# Patient Record
Sex: MALE | Race: WHITE | NOT HISPANIC OR LATINO | Employment: FULL TIME | URBAN - NONMETROPOLITAN AREA
[De-identification: names, ages, dates, MRNs, and addresses within clinical notes are randomized per-mention and may not be internally consistent; named-entity substitution may affect disease eponyms.]

---

## 2021-11-23 ENCOUNTER — OFFICE VISIT (OUTPATIENT)
Dept: FAMILY MEDICINE CLINIC | Facility: CLINIC | Age: 21
End: 2021-11-23

## 2021-11-23 VITALS
BODY MASS INDEX: 23.75 KG/M2 | TEMPERATURE: 99.2 F | HEIGHT: 73 IN | DIASTOLIC BLOOD PRESSURE: 68 MMHG | OXYGEN SATURATION: 100 % | SYSTOLIC BLOOD PRESSURE: 118 MMHG | HEART RATE: 112 BPM | WEIGHT: 179.2 LBS

## 2021-11-23 DIAGNOSIS — F41.9 ANXIETY AND DEPRESSION: Primary | ICD-10-CM

## 2021-11-23 DIAGNOSIS — K21.9 GASTROESOPHAGEAL REFLUX DISEASE WITHOUT ESOPHAGITIS: ICD-10-CM

## 2021-11-23 DIAGNOSIS — F32.A ANXIETY AND DEPRESSION: Primary | ICD-10-CM

## 2021-11-23 PROCEDURE — 99214 OFFICE O/P EST MOD 30 MIN: CPT

## 2021-11-23 RX ORDER — FLUOXETINE 10 MG/1
10 CAPSULE ORAL DAILY
Qty: 60 CAPSULE | Refills: 0 | Status: SHIPPED | OUTPATIENT
Start: 2021-11-23

## 2021-11-23 RX ORDER — PANTOPRAZOLE SODIUM 40 MG/1
40 TABLET, DELAYED RELEASE ORAL DAILY
Qty: 60 TABLET | Refills: 0 | Status: SHIPPED | OUTPATIENT
Start: 2021-11-23

## 2021-11-23 NOTE — PROGRESS NOTES
New Patient Office Visit      Patient Name: Travis Felty  : 2000   MRN: [id number]     Chief Complaint:    Chief Complaint   Patient presents with   • Work Release       History of Present Illness: Travis Felty is a 21 y.o. male who is here today to establish care.  Initially he is here for a work release after a recent exposure to Covid.  He states that a coworker of his was positive for Covid and he had contact with him.  The patient was removed from his work schedule until he had a negative Covid swab.  Patient does state that he had congestion, cough, chills, and loss of taste and smell that last for 2 days.  The symptoms have since resolved.  He has received 3 separate Covid swabs that have all been negative from 2 different establishments.  His employer is requiring a written note stating that he has been medically cleared to return to work.  He also would like to discuss his anxiety and acid reflux.  Has never taken anything for his acid reflux, but notes that he has had every day particularly with every meal.  Patient notes a previous diagnosis of generalized anxiety and moderate depression.  Has seen a therapist in the past and has tried Celexa and Lexapro.  Has not had any of those types of medications for 2 years.  Notices that he typically has anxiety when he is in large crowds.  He will experience an increase in his heart rate, diaphoresis, and difficulty with breathing.  He will ultimately leave the location that is making him feel anxious and will take approximately 1/2-hour to return back to normal.  This will happen approximately 2-3 times a week.  He reports having low energy and no interest in doing certain things.  He is able to go to work and care for his animals but does not have much interest in other things.  Denies any issues falling asleep, but will have intermittent racing thoughts at night.  He does wake up often throughout the night due to anxiety, especially worry.  Does  not feel rested when he wakes.  No history of suicidal attempts.  No current suicidal or homicidal ideations.    Subjective     Review of System: Review of Systems   Constitutional: Negative.    HENT: Negative.    Eyes: Negative.    Respiratory: Negative.    Gastrointestinal: Negative.    Genitourinary: Negative.    Musculoskeletal: Negative.    Skin: Negative.    Neurological: Negative.    Psychiatric/Behavioral: Positive for sleep disturbance and depressed mood. Negative for self-injury, suicidal ideas and stress. The patient is nervous/anxious.        I have reviewed the ROS documented by my clinical staff, updated appropriately and I agree. UKSUM Cesar    Past Medical History:   Past Medical History:   Diagnosis Date   • Asthma    • Depression    • GERD (gastroesophageal reflux disease)        Past Surgical History:   Past Surgical History:   Procedure Laterality Date   • CLAVICLE SURGERY  02/2020   • FOOT SURGERY Right 2020       Family History: History reviewed. No pertinent family history.    Social History:   Social History     Socioeconomic History   • Marital status: Single   Tobacco Use   • Smoking status: Never Smoker   • Smokeless tobacco: Never Used   Vaping Use   • Vaping Use: Every day   • Substances: Nicotine   • Devices: Disposable   Substance and Sexual Activity   • Alcohol use: No   • Drug use: Never   • Sexual activity: Yes     Partners: Male     Birth control/protection: None       Medications:     Current Outpatient Medications:   •  FLUoxetine (PROzac) 10 MG capsule, Take 1 capsule by mouth Daily., Disp: 60 capsule, Rfl: 0  •  pantoprazole (Protonix) 40 MG EC tablet, Take 1 tablet by mouth Daily., Disp: 60 tablet, Rfl: 0    Allergies:   Allergies   Allergen Reactions   • Triaminic Fever Reducer [Acetaminophen] Hives       Objective     Physical Exam:   Vital Signs:   Vitals:    11/23/21 1440   BP: 118/68   Pulse: 112   Temp: 99.2 °F (37.3 °C)   SpO2: 100%   Weight: 81.3 kg (179 lb 3.2  "oz)   Height: 185.4 cm (73\")     Body mass index is 23.64 kg/m².     Physical Exam  Vitals and nursing note reviewed.   Constitutional:       General: He is not in acute distress.     Appearance: Normal appearance. He is normal weight. He is not ill-appearing.   HENT:      Head: Normocephalic and atraumatic.      Right Ear: Tympanic membrane, ear canal and external ear normal.      Left Ear: Tympanic membrane, ear canal and external ear normal.      Nose: Nose normal.      Mouth/Throat:      Mouth: Mucous membranes are moist.      Pharynx: Oropharynx is clear. Uvula midline.   Eyes:      Extraocular Movements: Extraocular movements intact.      Conjunctiva/sclera: Conjunctivae normal.      Pupils: Pupils are equal, round, and reactive to light.      Funduscopic exam:     Right eye: Red reflex present.         Left eye: Red reflex present.  Neck:      Thyroid: No thyromegaly.      Vascular: No carotid bruit.      Trachea: Trachea normal.   Cardiovascular:      Rate and Rhythm: Normal rate and regular rhythm.      Pulses: Normal pulses.      Heart sounds: Normal heart sounds, S1 normal and S2 normal.   Pulmonary:      Effort: Pulmonary effort is normal.      Breath sounds: Normal breath sounds and air entry.   Abdominal:      General: Abdomen is flat. Bowel sounds are normal. There is no distension.      Palpations: Abdomen is soft.      Tenderness: There is no abdominal tenderness.   Musculoskeletal:      Cervical back: Neck supple. No tenderness.      Right lower leg: No edema.      Left lower leg: No edema.   Lymphadenopathy:      Head:      Right side of head: No submental, submandibular, tonsillar, preauricular, posterior auricular or occipital adenopathy.      Left side of head: No submental, submandibular, tonsillar, preauricular, posterior auricular or occipital adenopathy.      Cervical: No cervical adenopathy.   Skin:     General: Skin is warm and dry.      Capillary Refill: Capillary refill takes less than " 2 seconds.   Neurological:      General: No focal deficit present.      Mental Status: He is alert and oriented to person, place, and time. Mental status is at baseline.      GCS: GCS eye subscore is 4. GCS verbal subscore is 5. GCS motor subscore is 6.      Cranial Nerves: Cranial nerves are intact.      Sensory: Sensation is intact.      Coordination: Coordination is intact.      Deep Tendon Reflexes: Reflexes are normal and symmetric.   Psychiatric:         Attention and Perception: Attention normal.         Mood and Affect: Mood normal.         Speech: Speech normal.         Behavior: Behavior normal. Behavior is cooperative.         Assessment / Plan      Assessment/Plan:   Diagnoses and all orders for this visit:    1. Anxiety and depression (Primary)  -     FLUoxetine (PROzac) 10 MG capsule; Take 1 capsule by mouth Daily.  Dispense: 60 capsule; Refill: 0    2. Gastroesophageal reflux disease without esophagitis  -     pantoprazole (Protonix) 40 MG EC tablet; Take 1 tablet by mouth Daily.  Dispense: 60 tablet; Refill: 0         1. Will trial Prozac to help with anxiety and depression.  Advised patient to be aware of any potential side effects or suicidal ideations.  2. Will trial Protonix to help with acid reflux.  Instructed patient to take first thing in the morning with water prior to eating breakfast or with any other medications.  3. Work release given.      Follow Up:   Return in about 6 weeks (around 1/4/2022), or if symptoms worsen or fail to improve.    I spent approximately 30 minutes providing clinical care for this patient; including review of patient's chart and provider documentation, face to face time spent with patient in examination room (obtaining history, performing physical exam, discussing diagnosis and management options), placing orders, and completing patient documentation.     KUSUM Cesar  [facility]